# Patient Record
Sex: MALE | URBAN - METROPOLITAN AREA
[De-identification: names, ages, dates, MRNs, and addresses within clinical notes are randomized per-mention and may not be internally consistent; named-entity substitution may affect disease eponyms.]

---

## 2018-06-15 ENCOUNTER — TESTING ONLY (OUTPATIENT)
Dept: URBAN - METROPOLITAN AREA CLINIC 88 | Facility: CLINIC | Age: 53
End: 2018-06-15
Payer: COMMERCIAL

## 2018-06-15 PROCEDURE — 92310 CONTACT LENS FITTING OU: CPT | Performed by: OPTOMETRIST

## 2018-06-22 ENCOUNTER — TESTING ONLY (OUTPATIENT)
Dept: URBAN - METROPOLITAN AREA CLINIC 88 | Facility: CLINIC | Age: 53
End: 2018-06-22
Payer: COMMERCIAL

## 2018-06-22 PROCEDURE — 92310 CONTACT LENS FITTING OU: CPT | Performed by: OPTOMETRIST

## 2019-08-16 ENCOUNTER — OFFICE VISIT (OUTPATIENT)
Dept: URBAN - METROPOLITAN AREA CLINIC 88 | Facility: CLINIC | Age: 54
End: 2019-08-16
Payer: COMMERCIAL

## 2019-08-16 PROCEDURE — 99214 OFFICE O/P EST MOD 30 MIN: CPT | Performed by: OPTOMETRIST

## 2019-08-16 ASSESSMENT — INTRAOCULAR PRESSURE
OS: 14
OD: 14

## 2019-08-16 ASSESSMENT — KERATOMETRY
OS: 44.63
OD: 44.00

## 2019-08-16 ASSESSMENT — VISUAL ACUITY
OS: 20/20
OD: 20/20

## 2019-08-16 NOTE — IMPRESSION/PLAN
Impression: Type 2 diabetes mellitus w/o complication: M80.0. Plan: Findings were discussed in detail. The patient understands that there are no diabetic related changes taking place in their eyes. Patient understands the importance of monitoring blood glucose and blood pressure levels with their primary care physician to reduce the risk of diabetic related vision loss. Diet and exercise are recommended.

## 2019-08-18 NOTE — IMPRESSION/PLAN
Impression: Drusen (degenerative) of macula, bilateral: H35.363. Plan: Discussed very mild/early condition in detail with patient. Patient understands that a healthy diet and UV protection with polycarbonate lenses can reduce the progression of dry macular degeneration. Recommended AREDS 2 formula eye vitamins, especially since he has a family hx of ARMD (mother). Patient knows to return to clinic immediately if any changes in vision are experienced.

## 2020-10-09 ENCOUNTER — OFFICE VISIT (OUTPATIENT)
Dept: URBAN - METROPOLITAN AREA CLINIC 88 | Facility: CLINIC | Age: 55
End: 2020-10-09
Payer: COMMERCIAL

## 2020-10-09 DIAGNOSIS — H25.13 AGE-RELATED NUCLEAR CATARACT, BILATERAL: ICD-10-CM

## 2020-10-09 DIAGNOSIS — H52.13 MYOPIA, BILATERAL: ICD-10-CM

## 2020-10-09 DIAGNOSIS — H35.363 DRUSEN (DEGENERATIVE) OF MACULA, BILATERAL: Chronic | ICD-10-CM

## 2020-10-09 DIAGNOSIS — H11.153 PINGUECULA, BILATERAL: Chronic | ICD-10-CM

## 2020-10-09 DIAGNOSIS — E11.9 TYPE 2 DIABETES MELLITUS W/O COMPLICATION: Primary | ICD-10-CM

## 2020-10-09 PROCEDURE — 92250 FUNDUS PHOTOGRAPHY W/I&R: CPT | Performed by: OPTOMETRIST

## 2020-10-09 PROCEDURE — 99214 OFFICE O/P EST MOD 30 MIN: CPT | Performed by: OPTOMETRIST

## 2020-10-09 PROCEDURE — 92310 CONTACT LENS FITTING OU: CPT | Performed by: OPTOMETRIST

## 2020-10-09 ASSESSMENT — INTRAOCULAR PRESSURE
OD: 15
OS: 16

## 2020-10-09 ASSESSMENT — VISUAL ACUITY
OS: 20/20
OD: 20/20

## 2020-10-09 NOTE — IMPRESSION/PLAN
Impression: Type 2 diabetes mellitus w/o complication: S57.3. Plan: Findings were discussed in detail. The patient understands that there are no diabetic related changes taking place in their eyes. Patient understands the importance of monitoring blood glucose and blood pressure levels with their primary care physician to reduce the risk of diabetic related vision loss. Diet and exercise are recommended.

## 2020-10-09 NOTE — IMPRESSION/PLAN
Impression: Myopia, bilateral: H52.13. Plan: Reviewed refractive prescription in detail with patient and need for glasses to improve vision at this time. Discussed lens options and designs. Ok to release spectacle prescription. Will order new CL trials. Ok to dispense trials. Pt will call to have Rx finalized once he has had a chance to try them.

## 2022-05-04 ENCOUNTER — OFFICE VISIT (OUTPATIENT)
Dept: URBAN - METROPOLITAN AREA CLINIC 88 | Facility: CLINIC | Age: 57
End: 2022-05-04
Payer: COMMERCIAL

## 2022-05-04 DIAGNOSIS — H25.13 AGE-RELATED NUCLEAR CATARACT, BILATERAL: ICD-10-CM

## 2022-05-04 DIAGNOSIS — E11.9 TYPE 2 DIABETES MELLITUS W/O COMPLICATION: Primary | ICD-10-CM

## 2022-05-04 DIAGNOSIS — H52.13 MYOPIA, BILATERAL: ICD-10-CM

## 2022-05-04 DIAGNOSIS — H35.363 DRUSEN (DEGENERATIVE) OF MACULA, BILATERAL: ICD-10-CM

## 2022-05-04 DIAGNOSIS — H11.153 PINGUECULA, BILATERAL: ICD-10-CM

## 2022-05-04 PROCEDURE — 92014 COMPRE OPH EXAM EST PT 1/>: CPT | Performed by: OPTOMETRIST

## 2022-05-04 PROCEDURE — 92310 CONTACT LENS FITTING OU: CPT | Performed by: OPTOMETRIST

## 2022-05-04 ASSESSMENT — VISUAL ACUITY
OS: 20/20
OD: 20/20

## 2022-05-04 ASSESSMENT — INTRAOCULAR PRESSURE
OS: 13
OD: 13

## 2022-05-04 NOTE — IMPRESSION/PLAN
Impression: Myopia, bilateral: H52.13. Plan: Reviewed refractive prescription in detail with patient and need for glasses to improve vision at this time. Discussed lens options and designs. Ok to release spectacle prescription. CTL fitting performed today, Ok to release prescription today, with small change. Will order trials for pt to try before ordering boxes If patient not happy with new prescription, pt will call back and we can change back to old RX.

## 2022-05-04 NOTE — IMPRESSION/PLAN
Impression: Type 2 diabetes mellitus w/o complication: L75.0. Plan: Findings were discussed in detail. The patient understands that there are no diabetic related changes taking place in their eyes. Patient understands the importance of monitoring blood glucose and blood pressure levels with their primary care physician to reduce the risk of diabetic related vision loss. Diet and exercise are recommended.

## 2023-06-07 ENCOUNTER — OFFICE VISIT (OUTPATIENT)
Dept: URBAN - METROPOLITAN AREA CLINIC 88 | Facility: CLINIC | Age: 58
End: 2023-06-07
Payer: COMMERCIAL

## 2023-06-07 DIAGNOSIS — H11.153 PINGUECULA, BILATERAL: ICD-10-CM

## 2023-06-07 DIAGNOSIS — E11.9 TYPE 2 DIABETES MELLITUS W/O COMPLICATION: Primary | ICD-10-CM

## 2023-06-07 DIAGNOSIS — H25.811 COMBINED FORMS OF AGE-RELATED CATARACT, RIGHT EYE: ICD-10-CM

## 2023-06-07 DIAGNOSIS — H25.12 AGE-RELATED NUCLEAR CATARACT, LEFT EYE: ICD-10-CM

## 2023-06-07 DIAGNOSIS — H52.13 MYOPIA, BILATERAL: ICD-10-CM

## 2023-06-07 PROCEDURE — 99213 OFFICE O/P EST LOW 20 MIN: CPT | Performed by: OPTOMETRIST

## 2023-06-07 ASSESSMENT — INTRAOCULAR PRESSURE
OD: 15
OS: 13

## 2023-06-07 NOTE — IMPRESSION/PLAN
Impression: Type 2 diabetes mellitus w/o complication: B68.4. Plan: Findings were discussed in detail. The patient understands that there are no diabetic related changes taking place in their eyes. Patient understands the importance of monitoring blood glucose and blood pressure levels with their primary care physician to reduce the risk of diabetic related vision loss. Diet and exercise are recommended.

## 2023-06-07 NOTE — IMPRESSION/PLAN
Impression: Myopia, bilateral: H52.13. Plan: Pt happy with current glasses and CLs. Will update rxes in 1 year.

## 2023-06-07 NOTE — IMPRESSION/PLAN
Impression: Combined forms of age-related cataract, right eye: H25.811. Plan: Reviewed status of cataract with patient and potential effect on vision. Patient not experiencing a decrease in quality of life from cataracts and is comfortable monitoring for any decrease in vision before considering surgery options. No treatment at this time. Patient knows to return to clinic if decrease in vision experienced.

## 2025-04-30 ENCOUNTER — OFFICE VISIT (OUTPATIENT)
Dept: URBAN - METROPOLITAN AREA CLINIC 88 | Facility: CLINIC | Age: 60
End: 2025-04-30
Payer: COMMERCIAL

## 2025-04-30 DIAGNOSIS — H52.13 MYOPIA, BILATERAL: ICD-10-CM

## 2025-04-30 DIAGNOSIS — H10.413 CONJUNCTIVITIS - ALLERGIC: ICD-10-CM

## 2025-04-30 DIAGNOSIS — H11.153 PINGUECULA, BILATERAL: ICD-10-CM

## 2025-04-30 DIAGNOSIS — H43.391 OTHER VITREOUS OPACITIES, RIGHT EYE: ICD-10-CM

## 2025-04-30 DIAGNOSIS — H25.813 COMBINED FORMS OF AGE-RELATED CATARACT, BILATERAL: ICD-10-CM

## 2025-04-30 DIAGNOSIS — E11.9 TYPE 2 DIABETES MELLITUS W/O COMPLICATION: Primary | ICD-10-CM

## 2025-04-30 PROCEDURE — 92014 COMPRE OPH EXAM EST PT 1/>: CPT | Performed by: OPTOMETRIST

## 2025-04-30 ASSESSMENT — INTRAOCULAR PRESSURE
OD: 13
OS: 13

## 2025-05-21 ENCOUNTER — OFFICE VISIT (OUTPATIENT)
Dept: URBAN - METROPOLITAN AREA CLINIC 88 | Facility: CLINIC | Age: 60
End: 2025-05-21
Payer: COMMERCIAL

## 2025-05-21 DIAGNOSIS — H52.13 MYOPIA, BILATERAL: Primary | ICD-10-CM

## 2025-05-21 PROCEDURE — 92310 CONTACT LENS FITTING OU: CPT | Performed by: OPTOMETRIST
